# Patient Record
Sex: MALE | URBAN - METROPOLITAN AREA
[De-identification: names, ages, dates, MRNs, and addresses within clinical notes are randomized per-mention and may not be internally consistent; named-entity substitution may affect disease eponyms.]

---

## 2017-05-24 ENCOUNTER — OFFICE VISIT (OUTPATIENT)
Dept: SPORTS MEDICINE | Facility: CLINIC | Age: 22
End: 2017-05-24

## 2017-05-24 DIAGNOSIS — Z00.00 PHYSICAL EXAM: ICD-10-CM

## 2017-05-24 PROCEDURE — 99499 UNLISTED E&M SERVICE: CPT | Mod: S$PBB,,, | Performed by: ORTHOPAEDIC SURGERY

## 2017-05-24 NOTE — PROGRESS NOTES
CC: [facility]    HPI:   Kristian Ramirez is a pleasant 22 y.o.  Professional ; He states that over the past year he has not missed any time due to injury      Left ankle sprain during season; no time missed  Left thumb sprain; no time missed  Left knee bruise no time missed      Review of Systems   Constitution: Negative. Negative for chills, fever and night sweats.   HENT: Negative for congestion and headaches.    Eyes: Negative for blurred vision, left vision loss and right vision loss.   Cardiovascular: Negative for chest pain and syncope.   Respiratory: Negative for cough and shortness of breath.    Endocrine: Negative for polydipsia, polyphagia and polyuria.   Hematologic/Lymphatic: Negative for bleeding problem. Does not bruise/bleed easily.   Skin: Negative for dry skin, itching and rash.   Musculoskeletal: Negative for falls and muscle weakness.   Gastrointestinal: Negative for abdominal pain and bowel incontinence.   Genitourinary: Negative for bladder incontinence and nocturia.   Neurological: Negative for disturbances in coordination, loss of balance and seizures.   Psychiatric/Behavioral: Negative for depression. The patient does not have insomnia.    Allergic/Immunologic: Negative for hives and persistent infections.     PAST MEDICAL HISTORY: No past medical history on file.  PAST SURGICAL HISTORY: No past surgical history on file.  FAMILY HISTORY: No family history on file.  SOCIAL HISTORY:   Social History     Social History    Marital status: Unknown     Spouse name: N/A    Number of children: N/A    Years of education: N/A     Occupational History    Not on file.     Social History Main Topics    Smoking status: Not on file    Smokeless tobacco: Not on file    Alcohol use Not on file    Drug use: Unknown    Sexual activity: Not on file     Other Topics Concern    Not on file     Social History Narrative    No narrative on file       MEDICATIONS: No current outpatient  prescriptions on file.  ALLERGIES: Review of patient's allergies indicates:  Allergies not on file    VITAL SIGNS: There were no vitals taken for this visit.       PHYSICAL EXAM /  HIP  PHYSICAL EXAMINATION  General:  The patient is alert and oriented x 3.  Mood is pleasant.  Observation of ears, eyes and nose reveal no gross abnormalities.  HEENT: NCAT, sclera nonicteric  Lungs: Respirations are equal and unlabored..    Bilateral HIP EXAMINATION     OBSERVATION / INSPECTION  Gait:   Nonantalgic   Alignment:  Neutral   Scars:   None   Muscle atrophy: None   Effusion:  None   Warmth:  None   Discoloration:   None   Leg lengths:   Equal   Pelvis:   Level     TENDERNESS / CREPITUS (T/C):      T / C  Trochanteric bursa   - / -  Piriformis    - / -  SI joint    - / -  Psoas tendon   - / -  Rectus insertion  - / -  Adductor insertion  - / -  Pubic symphysis  - / -    ROM: (* = pain)    Flexion:    120 degrees  External rotation: 40 degrees  Internal rotation with axial load: 30 degrees  Internal rotation without axial load: 40 degrees  Abduction:  45 degrees  Adduction:   20 degrees    SPECIAL TESTS:  Pain w/ forced internal rotation (FADIR): -   Pain w/ forced external rotation (WESLEY): Absent   Circumduction test:    -  Stinchfield test:    Negative   Log roll:      Negative   Snapping hip (internal):   Negative   Sit-up pain:     Negative   Resisted sit-up pain:    Negative   Resisted sit-up with adductor contraction pain:  Negative   Step-down test:    +  Trendelenburg test:    Negative  Bridge test     +     EXTREMITY NEURO-VASCULAR EXAMINATION:   Sensation:  Grossly intact to light touch all dermatomal regions.   Motor Function:  Fully intact motor function at hip, knee, foot and ankle    DTRs;  quadriceps and  achilles 2+.  No clonus and downgoing Babinski.    Vascular status:  DP and PT pulses 2+, brisk capillary refill, symmetric.    Skin:  intact, compartments soft.    OTHER FINDINGS:    Increased laxity to  left thumb with varus stress at full extension      ASSESSMENT:    1. Thumb collateral ligament injury     PLAN:  1. Healthy  with thumb collateral ligament injury  2. Grade 3M

## 2017-08-28 PROBLEM — Z00.00 PHYSICAL EXAM: Status: RESOLVED | Noted: 2017-05-24 | Resolved: 2017-08-28

## 2019-07-22 DIAGNOSIS — Z02.5 SPORTS PHYSICAL: Primary | ICD-10-CM

## 2019-07-23 ENCOUNTER — IMAGING SERVICES (OUTPATIENT)
Dept: CV DIAGNOSTICS | Age: 24
End: 2019-07-23
Attending: INTERNAL MEDICINE

## 2019-07-23 ENCOUNTER — IMAGING SERVICES (OUTPATIENT)
Dept: GENERAL RADIOLOGY | Age: 24
End: 2019-07-23
Attending: INTERNAL MEDICINE

## 2019-07-23 DIAGNOSIS — Z02.5 SPORTS PHYSICAL: ICD-10-CM

## 2019-07-23 LAB
ASCENDING AORTA (AAD): 3 CM
AV MEAN GRADIENT (AVMG): 2 MMHG
AV PEAK GRADIENT (AVPG): 3.9 MMHG
AV PEAK VELOCITY (AVPV): 1 M/S
DOP CALC LVOT PEAK VEL (LVOTPV): 1 M/S
E WAVE DECELARATION TIME (MDT): 194.4 MS
EST RIGHT VENT SYSTOLIC PRESSURE BY TRICUSPID REGURGITATION JET (RVSP): 17.4 MMHG
INTERVENTRICULAR SEPTUM IN END DIASTOLE (IVSD): 0.9 CM
LEFT INTERNAL DIMENSION IN SYSTOLE (LVSD): 3.6 CM
LEFT VENTRICULAR INTERNAL DIMENSION IN DIASTOLE (LVDD): 5.4 CM
LEFT VENTRICULAR POSTERIOR WALL IN END DIASTOLE (LVPW): 0.9 CM
LV EF: 60 %
LV END SYSTOLIC LONGITUDINAL STRAIN GLOBAL (LVGS): -15 %
LVOT 2D (LVOTD): 2.3 CM
LVOT VTI (LVOTVTI): 18.3 CM
MV E TISSUE VEL LAT (MELV): 18.9 CM/S
MV E TISSUE VEL MED (MESV): 15.4 CM/S
MV E WAVE VEL/E TISSUE VEL MED(MSR): 3.7
MV PEAK A VELOCITY (MVPAV): 0.3 M/S
MV PEAK E VELOCITY (MVPEV): 0.6 M/S
RV TISSUE DOPPLER FREE WALL HEART RATE (RVSTV): 0.2 M/S
SINUSES OF VALSALVA (SVD): 3.3 CM
TRICUSPID VALVE PEAK REGURGITATION VELOCITY (TRPV): 1.9 M/S
TV ESTIMATED RIGHT ARTERIAL PRESSURE (RAP): 3 MMHG

## 2019-07-23 PROCEDURE — 93352 ADMIN ECG CONTRAST AGENT: CPT | Performed by: INTERNAL MEDICINE

## 2019-07-23 PROCEDURE — 93351 STRESS TTE COMPLETE: CPT | Performed by: INTERNAL MEDICINE

## 2019-07-23 PROCEDURE — 93306 TTE W/DOPPLER COMPLETE: CPT | Performed by: INTERNAL MEDICINE
